# Patient Record
Sex: MALE | URBAN - METROPOLITAN AREA
[De-identification: names, ages, dates, MRNs, and addresses within clinical notes are randomized per-mention and may not be internally consistent; named-entity substitution may affect disease eponyms.]

---

## 2024-02-02 ENCOUNTER — ANCILLARY PROCEDURE (OUTPATIENT)
Dept: PEDIATRIC CARDIOLOGY | Facility: CLINIC | Age: 24
End: 2024-02-02
Payer: COMMERCIAL

## 2024-02-02 ENCOUNTER — OFFICE VISIT (OUTPATIENT)
Dept: PEDIATRIC CARDIOLOGY | Facility: CLINIC | Age: 24
End: 2024-02-02
Payer: COMMERCIAL

## 2024-02-02 VITALS
BODY MASS INDEX: 26.01 KG/M2 | OXYGEN SATURATION: 97 % | SYSTOLIC BLOOD PRESSURE: 125 MMHG | WEIGHT: 152.34 LBS | DIASTOLIC BLOOD PRESSURE: 66 MMHG | HEIGHT: 64 IN | HEART RATE: 65 BPM

## 2024-02-02 DIAGNOSIS — R00.2 PALPITATIONS: ICD-10-CM

## 2024-02-02 DIAGNOSIS — Z86.79 H/O SUPRAVENTRICULAR TACHYCARDIA: Primary | ICD-10-CM

## 2024-02-02 DIAGNOSIS — R00.0 TACHYCARDIA: ICD-10-CM

## 2024-02-02 PROCEDURE — 99204 OFFICE O/P NEW MOD 45 MIN: CPT | Performed by: PEDIATRICS

## 2024-02-02 PROCEDURE — 93272 ECG/REVIEW INTERPRET ONLY: CPT | Performed by: PEDIATRICS

## 2024-02-02 PROCEDURE — 93000 ELECTROCARDIOGRAM COMPLETE: CPT | Performed by: PEDIATRICS

## 2024-02-02 NOTE — PROGRESS NOTES
Presentation   Subjective   Today we had the pleasure of seeingGabriel for a consultation visit at the request of Carmita Siu MD in our Pediatric Cardiology Clinic at [facility] on 2024.   is accompanied by 's mother, who provides the history.     is a 23 y.o. male referred for paroxysmal episodes of palpitations. On detailed questioning,  has a history of  SVT, found around 1 hour of life. He had one hospitalization for SVT within the first month of life for breakthrough SVT. Per mom, he was treated with beta-blockers and digoxin until he was about 3 year old. Since that time he had been relatively asymptomatic from a cardiac standpoint until recently.  They deny any hx WPW syndrome.    has recently had 2 episodes of palpitations, one on 24 and the other on 24, one at 0500 and the other at 0200. Both occurred while he was lying in bed, trying to go to sleep. He felt like his heart was beating fast and hard, sudden in onset (second episode) and slow in offset, lasting less than 5-10 minutes, became hot and sweaty and was associated with dizziness. The first time, he called an ambulance and they did an EKG that was reportedly normal with a  bpm. However, he feels the symptoms had somewhat improved by the time the EKG was done. The first occurrence was after he had been out drinking alcohol with his friends. The second time he had not been drinking. He states that he is physically active and felt that when he was having the palpitations, his HR seemed faster than when he is active.   also has occasional sharp chest pains/aches over left side of his chest, usually while lying in bed. The pain is worse with movement or deep breathing, lasts a few seconds, and then stops and are never associated with palpitations.  is physically active with soccer in the fall and he works out year round. He denies a  "history of difficulty in breathing, shortness of breath, syncope or exercise intolerance.  He does acknowledge anxiety since the first episode of palpitations. He drinks about 2 cups of coffee per week.  has also taken up nicotine use recently.     MEDICATIONS:  No current outpatient medications on file.    ALLERGIES: No known drug allergies  IMMUNIZATIONS: up to date  PAST MEDICAL HISTORY: There is no history of recent hospitalizations or surgeries.  FAMILY HISTORY: Father was adopted, paternal family history is unknown.There is no family history of sudden death, congenital heart defects, WPW syndrome, long QT syndrome, Brugada syndrome, hypertrophic cardiomyopathy, Marfan syndrome, Ehler-Danlos syndrome or pacemaker/ICD dependent conditions, periodic paralysis, unexplained seizures/ syncope/ MV accidents, syndactyly and congenital deafness.  SOCIAL AND DEVELOPMENTAL HISTORY: Age appropriate,  lives with alone in an apartment and commutes to [facility].  DIET: age appropriate / normal for age. However, he lives by himself and sometimes skips meals.     ROS: Constitutional symptoms, eyes, ears, nose, mouth and throat, gastrointestinal, respiratory, musculoskeletal, genitourinary, neurological, integumentary, endocrine, allergic/immunologic, and hematologic/lymphatic systems were reviewed with the patient/caregiver and all are negative except as described in the HPI.   Physical Examination     Vitals:    02/02/24 1055 02/02/24 1056   BP: 178/88 125/66   BP Location: Right leg Right arm   Patient Position: Sitting Sitting   Pulse: 65    SpO2: 97%    Weight: 69.1 kg (152 lb 5.4 oz)    Height: 1.62 m (5' 3.78\")       General: The patient is alert, awake, cooperative and in no acute pain or distress.    HEENT:  no dysmorphic features, jugular venous distension, cyanosis, facial edema or thyromegaly  Neck: supple, no JVD, no lymphadenopathy  Cardiovascular: Regular rate and rhythm, Normal S1 and S2, Normally " active precordium, No murmur, clicks, rub or gallop rhythm  Respiratory:  Lungs CTA bilaterally, no increased WOB, no retractions, no wheezes, rales, rhonchi  Abdomen: Soft non-tender and non-distended, no hepatomegaly, normal bowel sounds  Lymph: no lymphadenopathy  Extremities: warm and well perfused, pulses 2+ no radial femoral delay, CR<3. There is no evidence of peripheral edema, cyanosis or clubbing.   Neurologic: Alert, Appropriate and Active  Results   EKG: 15 lead EKG was performed in the clinic and reviewed. It reveals evidence of normal sinus rhythm with sinus arrhythmia at a rate of 63 bpm. The QRS frontal plane axis is normal. There is no evidence of chamber hypertrophy or pre-excitation. There is rSr' pattern in V1. The corrected QT interval is within normal limits.  Assessment & Recommendations   Assessment/Plan   Diagnosis:  1. H/O supraventricular tachycardia    2. Palpitations      Impression:  Gabriel Mac is a 23 y.o. male with hx  SVT and now with paroxysmal episodes of palpitations. On my evaluation,  has   1. H/O supraventricular tachycardia    2. Palpitations    , negative family hx, normal on cardiac exam, EKG showing NSR with no pre-excitation and rSr' pattern in V1.  I have reviewed his medical records and saw that he was evaluated by Dr Salcedo in 2018 for paroxsymal CP as well as an episode of palpitation at that time. Previously he seems to have had refractory  SVT that required him to be on propranolol as well as digoxin up to 1 yr of life.  I had a lengthy discussion regarding this with 's mother with help of illustrations. PALPITATIONS are the feelings of having rapid, fluttering or pounding heart. They can be triggered by stress, exercise, medication or, rarely, a medical condition. Although heart palpitations can be worrisome, they're usually harmless. In rare cases, heart palpitations can be a symptom of a more serious heart condition, such as an  arrhythmia, that may require treatment. We would like to record the patient´s rhythm at the time of the symptoms to ascertain the cause of the symptoms.   Based on the hx  SVT, he does have a 25-30% chance of having SVT related to re-entry and therefore discussed the options of management. We discussed about the pathophysiology, natural history and management options of patients with SVT. We discussed about the options of observation, medical management and invasive EP study and ablation. We discussed that the medications can reduce the frequency and severity of episodes but are not curative. I discussed with them the technical aspects of the EP study, the indications, the success rate (95%) and the potential risks and complications.   We will reconvene after reviewing his event monitor.  I recommend:  - Event monitor x1 month. In case we are unable to capture an episode related to symptoms free period vs technical causes, also discussed use of watches that can record EKG and may be useful to obtain a tracing during a breakthrough episode  - Meanwhile  should abstain from stimulants like caffeinated beverages or medications containing pseudoephedrine.   - We also discussed the role of vagal maneuvers and its effect as well as have asked the family to count the heart rate for 6 seconds.   - In case there are any episodes that are prolonged, not self-terminating or responding to vagal maneuvers, are associated with symptoms,  should be brought to the nearest ER.   -  does not need to be restricted from the cardiovascular standpoint,   -  does not need to follow SBE prophylaxis at times of predicted risks.   - We will decide on follow up based on the results of the event monitor.   - Lipid Screening: Recommend routine lipid screening per the American Academy of Pediatrics guidelines through primary care provider when age appropriate (For many children and adolescents, this is ages  9-11 and age 17-21).   - For up-to-date information regarding the COVID-19 vaccination, particularly as it pertains to pediatric patients please take a look at the American Academy of Pediatrics website (www.AAP.org), www.HealthyChildren.org) and the CDC (www.cdc.gov/vaccines/covid-19).   - Please contact my office at [phone] with any concerns or questions.   - After hours, if a medical emergency should arise please call [facility] at [phone] and ask to speak with the Pediatric Cardiology Fellow on call.    Doni Chappell MD  Pediatric Cardiology  [email or web address]    These findings and plans were discussed with his  mother, who appeared to be comfortable and verbalized understanding of both the plan and findings. There appeared to be no barriers to understanding.

## 2024-02-05 LAB
ATRIAL RATE: 63 BPM
P AXIS: 60 DEGREES
P OFFSET: 204 MS
P ONSET: 157 MS
PR INTERVAL: 126 MS
Q ONSET: 220 MS
QRS COUNT: 10 BEATS
QRS DURATION: 94 MS
QT INTERVAL: 404 MS
QTC CALCULATION(BAZETT): 413 MS
QTC FREDERICIA: 410 MS
R AXIS: 86 DEGREES
T AXIS: 56 DEGREES
T OFFSET: 422 MS
VENTRICULAR RATE: 63 BPM

## 2024-04-03 LAB — BODY SURFACE AREA: 1.76 M2

## 2024-10-13 ENCOUNTER — APPOINTMENT (OUTPATIENT)
Dept: CARDIOLOGY | Facility: HOSPITAL | Age: 24
End: 2024-10-13
Payer: COMMERCIAL

## 2024-10-13 ENCOUNTER — APPOINTMENT (OUTPATIENT)
Dept: RADIOLOGY | Facility: HOSPITAL | Age: 24
End: 2024-10-13
Payer: COMMERCIAL

## 2024-10-13 ENCOUNTER — HOSPITAL ENCOUNTER (EMERGENCY)
Facility: HOSPITAL | Age: 24
Discharge: HOME | End: 2024-10-13
Attending: STUDENT IN AN ORGANIZED HEALTH CARE EDUCATION/TRAINING PROGRAM
Payer: COMMERCIAL

## 2024-10-13 VITALS
OXYGEN SATURATION: 98 % | BODY MASS INDEX: 28.96 KG/M2 | DIASTOLIC BLOOD PRESSURE: 84 MMHG | TEMPERATURE: 98.2 F | RESPIRATION RATE: 18 BRPM | SYSTOLIC BLOOD PRESSURE: 117 MMHG | WEIGHT: 167.55 LBS | HEART RATE: 99 BPM

## 2024-10-13 DIAGNOSIS — R00.2 PALPITATIONS: Primary | ICD-10-CM

## 2024-10-13 LAB
ANION GAP SERPL CALC-SCNC: 18 MMOL/L (ref 10–20)
BASOPHILS # BLD AUTO: 0.03 X10*3/UL (ref 0–0.1)
BASOPHILS NFR BLD AUTO: 0.4 %
BUN SERPL-MCNC: 9 MG/DL (ref 6–23)
CALCIUM SERPL-MCNC: 9.2 MG/DL (ref 8.6–10.3)
CHLORIDE SERPL-SCNC: 108 MMOL/L (ref 98–107)
CO2 SERPL-SCNC: 17 MMOL/L (ref 21–32)
CREAT SERPL-MCNC: 0.73 MG/DL (ref 0.5–1.3)
D DIMER PPP FEU-MCNC: 359 NG/ML FEU
EGFRCR SERPLBLD CKD-EPI 2021: >90 ML/MIN/1.73M*2
EOSINOPHIL # BLD AUTO: 0.01 X10*3/UL (ref 0–0.7)
EOSINOPHIL NFR BLD AUTO: 0.1 %
ERYTHROCYTE [DISTWIDTH] IN BLOOD BY AUTOMATED COUNT: 11.9 % (ref 11.5–14.5)
GLUCOSE SERPL-MCNC: 100 MG/DL (ref 74–99)
HCT VFR BLD AUTO: 43.5 % (ref 41–52)
HGB BLD-MCNC: 15.5 G/DL (ref 13.5–17.5)
IMM GRANULOCYTES # BLD AUTO: 0.03 X10*3/UL (ref 0–0.7)
IMM GRANULOCYTES NFR BLD AUTO: 0.4 % (ref 0–0.9)
LYMPHOCYTES # BLD AUTO: 2.42 X10*3/UL (ref 1.2–4.8)
LYMPHOCYTES NFR BLD AUTO: 35.4 %
MCH RBC QN AUTO: 29.2 PG (ref 26–34)
MCHC RBC AUTO-ENTMCNC: 35.6 G/DL (ref 32–36)
MCV RBC AUTO: 82 FL (ref 80–100)
MONOCYTES # BLD AUTO: 0.49 X10*3/UL (ref 0.1–1)
MONOCYTES NFR BLD AUTO: 7.2 %
NEUTROPHILS # BLD AUTO: 3.85 X10*3/UL (ref 1.2–7.7)
NEUTROPHILS NFR BLD AUTO: 56.5 %
NRBC BLD-RTO: 0 /100 WBCS (ref 0–0)
PLATELET # BLD AUTO: 221 X10*3/UL (ref 150–450)
POTASSIUM SERPL-SCNC: 3 MMOL/L (ref 3.5–5.3)
RBC # BLD AUTO: 5.31 X10*6/UL (ref 4.5–5.9)
SODIUM SERPL-SCNC: 140 MMOL/L (ref 136–145)
WBC # BLD AUTO: 6.8 X10*3/UL (ref 4.4–11.3)

## 2024-10-13 PROCEDURE — 99284 EMERGENCY DEPT VISIT MOD MDM: CPT

## 2024-10-13 PROCEDURE — 2500000001 HC RX 250 WO HCPCS SELF ADMINISTERED DRUGS (ALT 637 FOR MEDICARE OP): Performed by: STUDENT IN AN ORGANIZED HEALTH CARE EDUCATION/TRAINING PROGRAM

## 2024-10-13 PROCEDURE — 36415 COLL VENOUS BLD VENIPUNCTURE: CPT | Performed by: STUDENT IN AN ORGANIZED HEALTH CARE EDUCATION/TRAINING PROGRAM

## 2024-10-13 PROCEDURE — 80048 BASIC METABOLIC PNL TOTAL CA: CPT | Performed by: STUDENT IN AN ORGANIZED HEALTH CARE EDUCATION/TRAINING PROGRAM

## 2024-10-13 PROCEDURE — 85025 COMPLETE CBC W/AUTO DIFF WBC: CPT | Performed by: STUDENT IN AN ORGANIZED HEALTH CARE EDUCATION/TRAINING PROGRAM

## 2024-10-13 PROCEDURE — 93005 ELECTROCARDIOGRAM TRACING: CPT

## 2024-10-13 PROCEDURE — 85379 FIBRIN DEGRADATION QUANT: CPT | Performed by: STUDENT IN AN ORGANIZED HEALTH CARE EDUCATION/TRAINING PROGRAM

## 2024-10-13 PROCEDURE — 71045 X-RAY EXAM CHEST 1 VIEW: CPT | Mod: FOREIGN READ | Performed by: RADIOLOGY

## 2024-10-13 PROCEDURE — 71045 X-RAY EXAM CHEST 1 VIEW: CPT

## 2024-10-13 RX ORDER — POTASSIUM CHLORIDE 1.5 G/1.58G
40 POWDER, FOR SOLUTION ORAL ONCE
Status: COMPLETED | OUTPATIENT
Start: 2024-10-13 | End: 2024-10-13

## 2024-10-13 ASSESSMENT — COLUMBIA-SUICIDE SEVERITY RATING SCALE - C-SSRS
6. HAVE YOU EVER DONE ANYTHING, STARTED TO DO ANYTHING, OR PREPARED TO DO ANYTHING TO END YOUR LIFE?: NO
1. IN THE PAST MONTH, HAVE YOU WISHED YOU WERE DEAD OR WISHED YOU COULD GO TO SLEEP AND NOT WAKE UP?: NO
2. HAVE YOU ACTUALLY HAD ANY THOUGHTS OF KILLING YOURSELF?: NO

## 2024-10-14 NOTE — ED PROVIDER NOTES
HPI   Chief Complaint   Patient presents with    Rapid Heart Rate       23-year-old male presents with reported lightheadedness and palpitations.  He states he had between 6 and 8 alcoholic beverages last night and began experiencing symptoms of lightheadedness after this episode.  He states he briefly felt as though he was having more trouble breathing, and that he subsequently sat himself down on the ground.  He did not lose consciousness.  He states his symptoms subsequently resolved and are not present on evaluation in the emergency department.  He denies any history of similar episodes.  He has a history of SVT as a  for which he has followed with peds cardiology, he has not had any similar episodes since infancy although he has had a history of palpitations and chest pain without significant cardiac findings on evaluation. He denies changes in vision, dizziness, syncope, chest pain, abdominal pain, nausea, vomiting, diarrhea, dysuria, numbness, tingling, weakness, gait imbalance, drug use, medication changes.          Patient History   No past medical history on file.  No past surgical history on file.  No family history on file.  Social History     Tobacco Use    Smoking status: Not on file    Smokeless tobacco: Not on file   Substance Use Topics    Alcohol use: Not on file    Drug use: Not on file       Physical Exam   ED Triage Vitals [10/13/24 0321]   Temperature Heart Rate Respirations BP   36.8 °C (98.2 °F) (!) 113 18 (!) 118/99      Pulse Ox Temp Source Heart Rate Source Patient Position   99 % Oral -- --      BP Location FiO2 (%)     -- --       Physical Exam  Vitals reviewed.   HENT:      Head: Normocephalic and atraumatic.      Mouth/Throat:      Pharynx: Oropharynx is clear.   Eyes:      Pupils: Pupils are equal, round, and reactive to light.   Cardiovascular:      Rate and Rhythm: Regular rhythm. Tachycardia present.      Pulses: Normal pulses.      Heart sounds: No murmur heard.     No  friction rub.   Pulmonary:      Effort: Pulmonary effort is normal.      Breath sounds: Normal breath sounds. No stridor. No wheezing, rhonchi or rales.   Abdominal:      Palpations: Abdomen is soft.      Tenderness: There is no abdominal tenderness. There is no right CVA tenderness, left CVA tenderness, guarding or rebound.   Musculoskeletal:         General: Normal range of motion.      Cervical back: Normal range of motion and neck supple.   Skin:     General: Skin is warm and dry.      Capillary Refill: Capillary refill takes less than 2 seconds.   Neurological:      General: No focal deficit present.      Mental Status: He is alert and oriented to person, place, and time.           ED Course & MDM   Diagnoses as of 24 1251   Palpitations                 No data recorded     [place] Coma Scale Score: 15 (10/13/24 0326 : Franck Ireland RN)                   12 lead EKG per my interpretation:    Rate: 113  Rhythm: Sinus tachycardia  Axis: Normal  Qtc: Normal  ST segments/T-waves: No ST elevation or depression. Isolated T wave inversion lead III.   Prior EKG to compare with are available. Dagger Q waves unchanged from prior EKG on 2024      Medical Decision Making  23-year-old male presents with reported lightheadedness and palpitations after drinking 6-8 alcoholic beverages last night, with brief episode of SOB. No LOC. Not currently experiencing symptoms. Has history of  SVT and follows with peds cardiology, has not required treatment since infancy, has a history of palpitations and paroxysmal chest pain without evidence of SVT per cardiology records. On evaluation, patient nontoxic appearing. HR 99 - 113, sinus tachycardia. EKG nonischemic. No murmurs or friction rub. Lungs CTAB. Physical examination unremarkable. Lab workup showing hypokalemic, which was repleted. Dimer negative, per PERC rule pulmonary embolism is low risk in this patient. CXR showing no significant cardiomegaly,  infiltrate or pneumothorax.  No evidence of pneumonia, pleural effusion, anemia.  Recommend outpatient cardiac follow-up.  Patient states he will call his cardiologist in the morning.  Patient remains asymptomatic.  Ambulated around the department without provoking symptoms.  Tolerating p.o. intake without difficulty.  Discussed follow-up with his cardiologist, discussed return precautions.  Patient voiced understanding agreement plan.  Has a friend with him and will be accompanied by parents who will watch him this evening.  Discharged in stable condition.        Procedure  Procedures     Doris Barbosa MD  11/08/24 2889

## 2024-10-16 LAB
ATRIAL RATE: 116 BPM
P AXIS: 23 DEGREES
PR INTERVAL: 139 MS
Q ONSET: 251 MS
QRS COUNT: 18 BEATS
QRS DURATION: 106 MS
QT INTERVAL: 328 MS
QTC CALCULATION(BAZETT): 450 MS
QTC FREDERICIA: 405 MS
R AXIS: 30 DEGREES
T AXIS: 18 DEGREES
T OFFSET: 415 MS
VENTRICULAR RATE: 113 BPM

## 2024-10-28 ENCOUNTER — LAB REQUISITION (OUTPATIENT)
Dept: LAB | Facility: HOSPITAL | Age: 24
End: 2024-10-28
Payer: COMMERCIAL

## 2024-10-28 DIAGNOSIS — R00.2 PALPITATIONS: ICD-10-CM

## 2024-10-28 LAB
ALBUMIN SERPL BCP-MCNC: 4.8 G/DL (ref 3.4–5)
ALP SERPL-CCNC: 67 U/L (ref 33–120)
ALT SERPL W P-5'-P-CCNC: 15 U/L (ref 10–52)
ANION GAP SERPL CALC-SCNC: 9 MMOL/L (ref 10–20)
AST SERPL W P-5'-P-CCNC: 21 U/L (ref 9–39)
BILIRUB SERPL-MCNC: 0.7 MG/DL (ref 0–1.2)
BUN SERPL-MCNC: 15 MG/DL (ref 6–23)
CALCIUM SERPL-MCNC: 9.9 MG/DL (ref 8.6–10.6)
CHLORIDE SERPL-SCNC: 105 MMOL/L (ref 98–107)
CO2 SERPL-SCNC: 31 MMOL/L (ref 21–32)
CREAT SERPL-MCNC: 0.97 MG/DL (ref 0.5–1.3)
EGFRCR SERPLBLD CKD-EPI 2021: >90 ML/MIN/1.73M*2
GLUCOSE SERPL-MCNC: 86 MG/DL (ref 74–99)
POTASSIUM SERPL-SCNC: 4 MMOL/L (ref 3.5–5.3)
PROT SERPL-MCNC: 7.3 G/DL (ref 6.4–8.2)
SODIUM SERPL-SCNC: 141 MMOL/L (ref 136–145)
T4 FREE SERPL-MCNC: 1.04 NG/DL (ref 0.78–1.48)
TSH SERPL-ACNC: 0.37 MIU/L (ref 0.44–3.98)

## 2024-10-28 PROCEDURE — 84439 ASSAY OF FREE THYROXINE: CPT

## 2024-10-28 PROCEDURE — 84443 ASSAY THYROID STIM HORMONE: CPT

## 2024-10-28 PROCEDURE — 80053 COMPREHEN METABOLIC PANEL: CPT
